# Patient Record
Sex: MALE | URBAN - METROPOLITAN AREA
[De-identification: names, ages, dates, MRNs, and addresses within clinical notes are randomized per-mention and may not be internally consistent; named-entity substitution may affect disease eponyms.]

---

## 2022-10-24 ENCOUNTER — APPOINTMENT (OUTPATIENT)
Dept: URBAN - METROPOLITAN AREA SURGERY 19 | Age: 62
Setting detail: DERMATOLOGY
End: 2022-10-31

## 2022-10-24 VITALS — DIASTOLIC BLOOD PRESSURE: 83 MMHG | SYSTOLIC BLOOD PRESSURE: 178 MMHG | TEMPERATURE: 97.2 F | HEART RATE: 71 BPM

## 2022-10-24 VITALS — DIASTOLIC BLOOD PRESSURE: 79 MMHG | SYSTOLIC BLOOD PRESSURE: 163 MMHG | HEART RATE: 64 BPM

## 2022-10-24 PROBLEM — C44.91 BASAL CELL CARCINOMA OF SKIN, UNSPECIFIED: Status: ACTIVE | Noted: 2022-10-24

## 2022-10-24 PROBLEM — C44.319 BASAL CELL CARCINOMA OF SKIN OF OTHER PARTS OF FACE: Status: ACTIVE | Noted: 2022-10-24

## 2022-10-24 PROCEDURE — OTHER CPT BILLER: OTHER

## 2022-10-24 PROCEDURE — OTHER MOHS SURGERY: OTHER

## 2022-10-24 PROCEDURE — 17311 MOHS 1 STAGE H/N/HF/G: CPT

## 2022-10-24 PROCEDURE — 99203 OFFICE O/P NEW LOW 30 MIN: CPT

## 2022-10-24 PROCEDURE — 14040 TIS TRNFR F/C/C/M/N/A/G/H/F: CPT

## 2022-10-24 NOTE — PROCEDURE: MOHS SURGERY
· cymbalta when able to take p o  · Will order p r n   Ativan IV  · This is a chronic condition  · He is currently stable Advancement Flap (Double) Text: The defect edges were debeveled with a #15 scalpel blade.  Given the location of the defect and the proximity to free margins a double advancement flap was deemed most appropriate.  Using a sterile surgical marker, the appropriate advancement flaps were drawn incorporating the defect and placing the expected incisions within the relaxed skin tension lines where possible.    The area thus outlined was incised deep to adipose tissue with a #15 scalpel blade.  The skin margins were undermined to an appropriate distance in all directions utilizing iris scissors.

## 2022-10-24 NOTE — PROCEDURE: CPT BILLER
Procedure Summary (Optional): Decision to perform surgery\\nAfter the Mohs procedure was completed, a separate and distinct evaluation and management was performed for the resultant surgical defect for potential reconstruction.\\n\\nHistory: \\nUlcerative wound s/p Mohs surgery with tumor-free clear surgical margins performed today. Physician reviewed the medications, allergies, social history including place of residence and family support systems, ability to manage wound care and transportation issues, smoking and alcohol use, past medical and surgical history to determine how anticoagulants, artificial joints/valves, pacemakers/defibrillators, prior surgeries in the vicinity, and immunosuppression, among other factors will impact the reconstruction options, bleeding risks and need for surgical site and/or implanted device antibiotic prophylaxis. \\n\\nSkin Examination: \\nThe resultant cutaneous wound, surrounding soft tissue and adjacent anatomic free margins were examined and palpated to determine tissue mobility, laxity and risks of functional distortion and loss of normal anatomic form with various reconstruction options.\\n\\nMedical Decision Making:\\nThere is substantial tissue loss (evidenced in photographs) after tumor extirpation involving critical aesthetic and functional units. Failure to repair this defect (i.e. second intention healing) would result in compromise of function and/or form. Based on the history, exam and assessment, it was determined that surgical repair was indicated. Primary repair would significantly negatively impact function and/or form of involved structures and was deemed unsuitable. Additional options for repair including local skin flaps, various grafts and skin substitutes were evaluated and considered. After this evaluation, surgical repair was chosen as indicated and specified in the Repair section below. The repair options were discussed with the patient (and family) including but not limited to impact on function, form and risks/benefits of chosen repair.\\n*Avoid distortion of normal anatomy by forces of wound contraction of second intention healing. \\n*Facilitate wound healing and thereby minimize morbidity of second intention healing. \\n*Enhance functional outcome following tumor extirpation. \\n*Restore normal anatomic appearance following tumor extirpation. \\n

## 2022-10-24 NOTE — PROCEDURE: MOHS SURGERY
n/a Tissue Cultured Epidermal Autograft Text: The defect edges were debeveled with a #15 scalpel blade.  Given the location of the defect, shape of the defect and the proximity to free margins a tissue cultured epidermal autograft was deemed most appropriate.  The graft was then trimmed to fit the size of the defect.  The graft was then placed in the primary defect and oriented appropriately.

## 2024-12-26 NOTE — PROCEDURE: MOHS SURGERY
Goals met , dc home ,education complete   Consent (Spinal Accessory)/Introductory Paragraph: The rationale for Mohs was explained to the patient and consent was obtained. The risks, benefits and alternatives to therapy were discussed in detail. Specifically, the risks of damage to the spinal accessory nerve, infection, scarring, bleeding, prolonged wound healing, incomplete removal, allergy to anesthesia, and recurrence were addressed. Prior to the procedure, the treatment site was clearly identified and confirmed by the patient. All components of Universal Protocol/PAUSE Rule completed.